# Patient Record
Sex: FEMALE | Race: BLACK OR AFRICAN AMERICAN | NOT HISPANIC OR LATINO | Employment: OTHER | URBAN - METROPOLITAN AREA
[De-identification: names, ages, dates, MRNs, and addresses within clinical notes are randomized per-mention and may not be internally consistent; named-entity substitution may affect disease eponyms.]

---

## 2019-06-11 PROBLEM — D50.0 IRON DEFICIENCY ANEMIA DUE TO CHRONIC BLOOD LOSS: Status: ACTIVE | Noted: 2019-06-11

## 2019-06-11 PROBLEM — F22 PARANOID: Status: ACTIVE | Noted: 2019-06-11

## 2019-06-11 PROBLEM — I10 HYPERTENSION: Status: ACTIVE | Noted: 2019-06-11

## 2019-06-11 PROBLEM — I63.9 CEREBROVASCULAR ACCIDENT (CVA): Status: ACTIVE | Noted: 2019-06-11

## 2019-06-11 PROBLEM — N92.1 MENORRHAGIA WITH IRREGULAR CYCLE: Status: ACTIVE | Noted: 2019-06-11

## 2019-06-11 PROBLEM — E11.9 TYPE 2 DIABETES MELLITUS WITHOUT COMPLICATION, WITH LONG-TERM CURRENT USE OF INSULIN: Status: ACTIVE | Noted: 2019-06-11

## 2019-06-11 PROBLEM — Z71.6 ENCOUNTER FOR TOBACCO USE CESSATION COUNSELING: Status: ACTIVE | Noted: 2019-06-11

## 2019-06-11 PROBLEM — K21.9 GASTROESOPHAGEAL REFLUX DISEASE: Status: ACTIVE | Noted: 2019-06-11

## 2019-06-11 PROBLEM — G47.00 INSOMNIA: Status: ACTIVE | Noted: 2019-06-11

## 2019-06-11 PROBLEM — F32.A DEPRESSION: Status: ACTIVE | Noted: 2019-06-11

## 2019-06-11 PROBLEM — Z79.4 TYPE 2 DIABETES MELLITUS WITHOUT COMPLICATION, WITH LONG-TERM CURRENT USE OF INSULIN: Status: ACTIVE | Noted: 2019-06-11

## 2019-09-17 PROBLEM — E11.9 DIABETES MELLITUS WITHOUT COMPLICATION: Status: ACTIVE | Noted: 2019-09-17

## 2019-09-18 PROBLEM — Z98.891 HISTORY OF 3 CESAREAN SECTIONS: Status: ACTIVE | Noted: 2019-09-18

## 2019-11-20 PROBLEM — F20.9 SCHIZOPHRENIA: Status: ACTIVE | Noted: 2019-11-20

## 2019-12-04 PROBLEM — Z86.73 HISTORY OF CVA (CEREBROVASCULAR ACCIDENT): Status: ACTIVE | Noted: 2019-12-04

## 2019-12-10 PROBLEM — R29.898 WEAKNESS OF EXTREMITY: Status: ACTIVE | Noted: 2019-12-10

## 2020-02-19 PROBLEM — I63.9 CEREBROVASCULAR ACCIDENT (CVA): Status: RESOLVED | Noted: 2019-06-11 | Resolved: 2020-02-19

## 2020-02-19 PROBLEM — F17.200 TOBACCO USE DISORDER: Status: ACTIVE | Noted: 2020-02-19

## 2020-02-19 PROBLEM — F22 PARANOID: Status: RESOLVED | Noted: 2019-06-11 | Resolved: 2020-02-19

## 2020-02-19 PROBLEM — F32.A DEPRESSION: Status: RESOLVED | Noted: 2019-06-11 | Resolved: 2020-02-19

## 2020-08-14 PROBLEM — L03.115 CELLULITIS OF RIGHT LOWER EXTREMITY: Status: ACTIVE | Noted: 2020-08-14

## 2020-08-14 PROBLEM — R07.9 CHEST PAIN: Status: ACTIVE | Noted: 2020-08-14

## 2020-08-14 PROBLEM — D64.9 ANEMIA: Status: ACTIVE | Noted: 2020-08-14

## 2020-08-15 PROBLEM — R07.9 CHEST PAIN: Status: RESOLVED | Noted: 2020-08-14 | Resolved: 2020-08-15

## 2020-09-01 PROBLEM — R27.9 LACK OF COORDINATION AS LATE EFFECT OF CEREBROVASCULAR ACCIDENT (CVA): Status: ACTIVE | Noted: 2020-09-01

## 2020-09-01 PROBLEM — I69.398 LACK OF COORDINATION AS LATE EFFECT OF CEREBROVASCULAR ACCIDENT (CVA): Status: ACTIVE | Noted: 2020-09-01

## 2020-11-18 PROBLEM — F20.9 SCHIZOPHRENIA: Chronic | Status: ACTIVE | Noted: 2019-11-20

## 2020-11-29 PROBLEM — R53.1 LEFT-SIDED WEAKNESS: Status: ACTIVE | Noted: 2020-11-29

## 2020-11-29 PROBLEM — D57.3 SICKLE CELL TRAIT: Status: ACTIVE | Noted: 2017-10-30

## 2020-11-29 PROBLEM — G81.94 HEMIPARESIS OF LEFT NONDOMINANT SIDE: Status: ACTIVE | Noted: 2018-12-16

## 2020-11-29 PROBLEM — Z86.19 HISTORY OF CHLAMYDIA: Status: ACTIVE | Noted: 2017-09-27

## 2020-11-29 PROBLEM — Z86.19 HISTORY OF GONORRHEA: Status: ACTIVE | Noted: 2017-09-27

## 2020-11-29 PROBLEM — I63.511 CEREBROVASCULAR ACCIDENT (CVA) DUE TO OCCLUSION OF RIGHT MIDDLE CEREBRAL ARTERY: Status: ACTIVE | Noted: 2020-11-29

## 2020-11-29 PROBLEM — R47.01 APHASIA: Status: ACTIVE | Noted: 2020-11-29

## 2021-01-05 PROBLEM — I20.89 STABLE ANGINA PECTORIS: Status: ACTIVE | Noted: 2021-01-05

## 2021-01-05 PROBLEM — E65 BUFFALO HUMP: Status: ACTIVE | Noted: 2021-01-05

## 2021-01-05 PROBLEM — R53.83 FATIGUE: Status: ACTIVE | Noted: 2021-01-05

## 2021-01-05 PROBLEM — R30.0 DYSURIA: Status: ACTIVE | Noted: 2021-01-05

## 2021-02-25 PROBLEM — N30.01 ACUTE CYSTITIS WITH HEMATURIA: Status: ACTIVE | Noted: 2021-02-25

## 2021-04-26 PROBLEM — D21.9 FIBROIDS: Status: ACTIVE | Noted: 2021-04-26

## 2021-04-26 PROBLEM — A64 STD (FEMALE): Status: ACTIVE | Noted: 2021-04-26

## 2021-04-26 PROBLEM — Z11.3 SCREENING EXAMINATION FOR STD (SEXUALLY TRANSMITTED DISEASE): Status: ACTIVE | Noted: 2019-06-11

## 2021-04-26 PROBLEM — N93.9 ABNORMAL UTERINE BLEEDING (AUB): Status: ACTIVE | Noted: 2021-04-26

## 2021-05-12 ENCOUNTER — PATIENT MESSAGE (OUTPATIENT)
Dept: RESEARCH | Facility: HOSPITAL | Age: 41
End: 2021-05-12

## 2021-07-13 PROBLEM — F12.10 CANNABIS USE DISORDER, MILD, ABUSE: Status: ACTIVE | Noted: 2021-07-13

## 2021-10-20 PROBLEM — N30.01 ACUTE CYSTITIS WITH HEMATURIA: Status: RESOLVED | Noted: 2021-02-25 | Resolved: 2021-10-20

## 2022-06-14 PROBLEM — Z11.3 SCREENING EXAMINATION FOR STD (SEXUALLY TRANSMITTED DISEASE): Status: RESOLVED | Noted: 2019-06-11 | Resolved: 2022-06-14

## 2022-06-14 PROBLEM — I20.89 STABLE ANGINA PECTORIS: Status: RESOLVED | Noted: 2021-01-05 | Resolved: 2022-06-14

## 2022-11-02 PROBLEM — R47.01 APHASIA: Status: RESOLVED | Noted: 2020-11-29 | Resolved: 2022-11-02

## 2022-11-04 PROBLEM — E11.65 UNCONTROLLED TYPE 2 DIABETES MELLITUS WITH HYPERGLYCEMIA: Status: ACTIVE | Noted: 2022-11-04

## 2023-10-24 ENCOUNTER — CLINICAL SUPPORT (OUTPATIENT)
Dept: SMOKING CESSATION | Facility: CLINIC | Age: 43
End: 2023-10-24
Payer: COMMERCIAL

## 2023-10-24 DIAGNOSIS — F17.200 NICOTINE DEPENDENCE: Primary | ICD-10-CM

## 2023-10-24 PROCEDURE — 99404 PR PREVENT COUNSEL,INDIV,60 MIN: ICD-10-PCS | Mod: S$GLB,,, | Performed by: GENERAL PRACTICE

## 2023-10-24 PROCEDURE — 99404 PREV MED CNSL INDIV APPRX 60: CPT | Mod: S$GLB,,, | Performed by: GENERAL PRACTICE

## 2023-10-24 RX ORDER — IBUPROFEN 200 MG
1 TABLET ORAL DAILY
Qty: 28 PATCH | Refills: 0 | Status: SHIPPED | OUTPATIENT
Start: 2023-10-24 | End: 2023-11-16

## 2023-10-24 RX ORDER — MICONAZOLE NITRATE 2 %
2 CREAM (GRAM) TOPICAL
Qty: 100 EACH | Refills: 0 | Status: SHIPPED | OUTPATIENT
Start: 2023-10-24 | End: 2024-03-01 | Stop reason: SDUPTHER

## 2023-11-20 ENCOUNTER — CLINICAL SUPPORT (OUTPATIENT)
Dept: SMOKING CESSATION | Facility: CLINIC | Age: 43
End: 2023-11-20

## 2023-11-20 DIAGNOSIS — F17.200 NICOTINE DEPENDENCE: Primary | ICD-10-CM

## 2023-11-20 PROCEDURE — 99402 PREV MED CNSL INDIV APPRX 30: CPT | Mod: S$GLB,,, | Performed by: GENERAL PRACTICE

## 2023-11-20 PROCEDURE — 99402 PR PREVENT COUNSEL,INDIV,30 MIN: ICD-10-PCS | Mod: S$GLB,,, | Performed by: GENERAL PRACTICE

## 2023-11-20 NOTE — PROGRESS NOTES
Individual Follow-Up Form    11/20/2023    Quit Date: TBD    Clinical Status of Patient: Outpatient    Length of Service: 30 minutes    Continuing Medication: yes  Nicotine gum    Other Medications: none     Target Symptoms: Withdrawal and medication side effects. The following were  rated moderate (3) to severe (4) on TCRS:  Moderate (3): none  Severe (4): none    Comments: Followed up with patient by phone. Patient is smoking around a half a pack per day.  orientation, client introductions, completion of TCRS (Tobacco Cessation Rating Scale) learned addiction model, cues/triggers, personal reasons for quitting, medications, goals, quit date. The patient will continue with  therapy sessions and medication monitoring by CTTS. Prescribed medication management will be by physician. The patient denies any abnormal behavioral or mental changes at this time.      Diagnosis: F17.210    Next Visit: 2 weeks

## 2023-12-19 ENCOUNTER — CLINICAL SUPPORT (OUTPATIENT)
Dept: SMOKING CESSATION | Facility: CLINIC | Age: 43
End: 2023-12-19
Payer: COMMERCIAL

## 2023-12-19 DIAGNOSIS — F17.200 NICOTINE DEPENDENCE: Primary | ICD-10-CM

## 2023-12-19 PROCEDURE — 99402 PREV MED CNSL INDIV APPRX 30: CPT | Mod: S$GLB,,, | Performed by: GENERAL PRACTICE

## 2023-12-19 PROCEDURE — 99402 PR PREVENT COUNSEL,INDIV,30 MIN: ICD-10-PCS | Mod: S$GLB,,, | Performed by: GENERAL PRACTICE

## 2023-12-19 RX ORDER — NICOTINE 7MG/24HR
1 PATCH, TRANSDERMAL 24 HOURS TRANSDERMAL DAILY
Qty: 28 PATCH | Refills: 0 | Status: SHIPPED | OUTPATIENT
Start: 2023-12-19 | End: 2023-12-19 | Stop reason: ALTCHOICE

## 2023-12-19 RX ORDER — IBUPROFEN 200 MG
1 TABLET ORAL DAILY
Qty: 28 PATCH | Refills: 0 | Status: SHIPPED | OUTPATIENT
Start: 2023-12-19 | End: 2024-03-01 | Stop reason: SDUPTHER

## 2023-12-20 NOTE — PROGRESS NOTES
Individual Follow-Up Form    12/20/2023    Quit Date: TBD    Clinical Status of Patient: Outpatient    Length of Service: 30 minutes    Continuing Medication: yes  Patches    Other Medications: gum     Target Symptoms: Withdrawal and medication side effects. The following were  rated moderate (3) to severe (4) on TCRS:  Moderate (3): none  Severe (4): none    Comments: Followed up with patient by phone. Patient is down to 3 cigarettes per day. Completion of TCRS (Tobacco Cessation Rating Scale) reviewed strategies, cues, and triggers. Introduced the negative impact of tobacco on health, the health advantages of discontinuing the use of tobacco, time line improved health changes after a quit, withdrawal issues to expect from nicotine and habit, and ways to achieve the goal of a quit. The patient will continue with  therapy sessions and medication monitoring by CTTS. Prescribed medication management will be by physician. The patient denies any abnormal behavioral or mental changes at this time.      Diagnosis: F17.210    Next Visit: 2 weeks

## 2024-01-18 ENCOUNTER — CLINICAL SUPPORT (OUTPATIENT)
Dept: SMOKING CESSATION | Facility: CLINIC | Age: 44
End: 2024-01-18

## 2024-01-18 DIAGNOSIS — F17.200 NICOTINE DEPENDENCE: Primary | ICD-10-CM

## 2024-01-18 NOTE — PROGRESS NOTES
Called pt to f/u on her 3 month smoking cessation quit status. Pt stated she is still smoking, but has cut back and is using nicotine gum and patches to aid in her quit. Informed her she has benefits available and is able to rejoin. Pt not ready to make appointment. She will call back when ready. Informed her of benefit period, phone follow ups, and contact information. Will complete smart form and will continue to follow up on quit #1 episode.

## 2024-01-22 DIAGNOSIS — F17.200 NICOTINE DEPENDENCE: ICD-10-CM

## 2024-01-22 RX ORDER — IBUPROFEN 200 MG
1 TABLET ORAL DAILY
Qty: 28 PATCH | Refills: 0 | OUTPATIENT
Start: 2024-01-22

## 2024-02-01 PROBLEM — E66.811 CLASS 1 OBESITY DUE TO EXCESS CALORIES WITH BODY MASS INDEX (BMI) OF 30.0 TO 30.9 IN ADULT: Status: ACTIVE | Noted: 2024-02-01

## 2024-02-01 PROBLEM — E66.09 CLASS 1 OBESITY DUE TO EXCESS CALORIES WITH BODY MASS INDEX (BMI) OF 30.0 TO 30.9 IN ADULT: Status: ACTIVE | Noted: 2024-02-01

## 2024-02-01 PROBLEM — I63.511 CEREBROVASCULAR ACCIDENT (CVA) DUE TO OCCLUSION OF RIGHT MIDDLE CEREBRAL ARTERY: Status: RESOLVED | Noted: 2020-11-29 | Resolved: 2024-02-01

## 2024-03-01 DIAGNOSIS — F17.200 NICOTINE DEPENDENCE: ICD-10-CM

## 2024-03-03 RX ORDER — IBUPROFEN 200 MG
1 TABLET ORAL DAILY
Qty: 28 PATCH | Refills: 0 | Status: SHIPPED | OUTPATIENT
Start: 2024-03-03

## 2024-03-03 RX ORDER — MICONAZOLE NITRATE 2 %
2 CREAM (GRAM) TOPICAL
Qty: 100 EACH | Refills: 0 | Status: SHIPPED | OUTPATIENT
Start: 2024-03-03

## 2024-07-31 PROBLEM — B35.1 ONYCHOMYCOSIS: Status: ACTIVE | Noted: 2024-07-31

## 2024-07-31 PROBLEM — L60.0 IGTN (INGROWING TOE NAIL): Status: ACTIVE | Noted: 2024-07-31

## 2024-11-01 ENCOUNTER — CLINICAL SUPPORT (OUTPATIENT)
Dept: SMOKING CESSATION | Facility: CLINIC | Age: 44
End: 2024-11-01

## 2024-11-01 DIAGNOSIS — F17.200 NICOTINE DEPENDENCE: Primary | ICD-10-CM

## 2025-02-20 PROBLEM — L84 CORNS AND CALLUS: Status: ACTIVE | Noted: 2025-02-20

## 2025-02-20 PROBLEM — L60.3 NAIL DYSTROPHY: Status: ACTIVE | Noted: 2025-02-20

## 2025-04-22 ENCOUNTER — PATIENT OUTREACH (OUTPATIENT)
Dept: ADMINISTRATIVE | Facility: HOSPITAL | Age: 45
End: 2025-04-22
Payer: MEDICAID

## 2025-04-22 DIAGNOSIS — E11.9 TYPE 2 DIABETES MELLITUS WITHOUT COMPLICATION, WITH LONG-TERM CURRENT USE OF INSULIN: Primary | ICD-10-CM

## 2025-04-22 DIAGNOSIS — Z79.4 TYPE 2 DIABETES MELLITUS WITHOUT COMPLICATION, WITH LONG-TERM CURRENT USE OF INSULIN: Primary | ICD-10-CM
